# Patient Record
Sex: FEMALE | Employment: OTHER | URBAN - METROPOLITAN AREA
[De-identification: names, ages, dates, MRNs, and addresses within clinical notes are randomized per-mention and may not be internally consistent; named-entity substitution may affect disease eponyms.]

---

## 2020-07-10 ENCOUNTER — TELEPHONE (OUTPATIENT)
Dept: HEMATOLOGY/ONCOLOGY | Facility: CLINIC | Age: 70
End: 2020-07-10

## 2020-07-10 DIAGNOSIS — C20 RECTAL CANCER: Primary | ICD-10-CM

## 2020-07-10 NOTE — TELEPHONE ENCOUNTER
Received fax referral for pt to see surgical oncology for rectal ca s/p chemo/XRT. Records scanned into media. Messaged  to look at chart. She placed order for MRI to be done prior to visit. Attempted to call pt to review referral & schedule, no answer, no voicemail. Called referring provider's office & left message for Rain Delaney.

## 2020-07-13 NOTE — TELEPHONE ENCOUNTER
Called & spoke with pt's sister, scheduled appt for next Monday at 9am with . Will mail appt letter. Spoke with referring provider's office, they are going to overnight CD of imaging. Pt's sister thinks that pt had an MRI last week. Once CD received, if no MRI done, will schedule for the same day she comes.

## 2020-07-15 NOTE — TELEPHONE ENCOUNTER
Received CD with imaging & brought to film library to upload. Called & spoke with pt's sister, pt only had CT last week so informed sister she will need MRI. They are agreeable to doing in [place], scheduled on Friday evening.

## 2020-07-20 DIAGNOSIS — C20 RECTAL CANCER: Primary | ICD-10-CM

## 2020-07-21 ENCOUNTER — TELEPHONE (OUTPATIENT)
Dept: SURGERY | Facility: CLINIC | Age: 70
End: 2020-07-21

## 2020-07-27 ENCOUNTER — TELEPHONE (OUTPATIENT)
Dept: SURGERY | Facility: CLINIC | Age: 70
End: 2020-07-27

## 2020-07-27 NOTE — TELEPHONE ENCOUNTER
----- Message from Trisha Ruth sent at 7/27/2020 11:05 AM CDT -----  Contact: sister  Pt's sister calling to reschedule appts for tomorrow         Please contact Lisa (sister): [phone]

## 2020-07-27 NOTE — TELEPHONE ENCOUNTER
----- Message from Miranda Jung sent at 7/27/2020  2:53 PM CDT -----  Regarding: Appointment  Contact: Lisa/ [phone]  Calling regarding missed call from [place] to reschedule appointments on the same day. Please call

## 2020-08-11 ENCOUNTER — TELEPHONE (OUTPATIENT)
Dept: SURGERY | Facility: CLINIC | Age: 70
End: 2020-08-11

## 2020-08-11 ENCOUNTER — HOSPITAL ENCOUNTER (OUTPATIENT)
Dept: RADIOLOGY | Facility: HOSPITAL | Age: 70
Discharge: HOME | End: 2020-08-11
Attending: COLON & RECTAL SURGERY
Payer: MEDICARE

## 2020-08-11 ENCOUNTER — OFFICE VISIT (OUTPATIENT)
Dept: SURGERY | Facility: CLINIC | Age: 70
End: 2020-08-11
Attending: COLON & RECTAL SURGERY
Payer: MEDICARE

## 2020-08-11 VITALS
BODY MASS INDEX: 17.53 KG/M2 | WEIGHT: 95.25 LBS | HEIGHT: 62 IN | HEART RATE: 58 BPM | SYSTOLIC BLOOD PRESSURE: 181 MMHG | DIASTOLIC BLOOD PRESSURE: 83 MMHG

## 2020-08-11 DIAGNOSIS — C20 RECTAL CANCER: ICD-10-CM

## 2020-08-11 DIAGNOSIS — C20 RECTAL CANCER: Primary | ICD-10-CM

## 2020-08-11 PROCEDURE — 25500020 PHARM REV CODE 255: Performed by: COLON & RECTAL SURGERY

## 2020-08-11 PROCEDURE — 99203 OFFICE O/P NEW LOW 30 MIN: CPT | Mod: S$GLB,,, | Performed by: COLON & RECTAL SURGERY

## 2020-08-11 PROCEDURE — 1101F PR PT FALLS ASSESS DOC 0-1 FALLS W/OUT INJ PAST YR: ICD-10-PCS | Mod: CPTII,S$GLB,, | Performed by: COLON & RECTAL SURGERY

## 2020-08-11 PROCEDURE — 1125F PR PAIN SEVERITY QUANTIFIED, PAIN PRESENT: ICD-10-PCS | Mod: S$GLB,,, | Performed by: COLON & RECTAL SURGERY

## 2020-08-11 PROCEDURE — 1159F MED LIST DOCD IN RCRD: CPT | Mod: S$GLB,,, | Performed by: COLON & RECTAL SURGERY

## 2020-08-11 PROCEDURE — 72197 MRI RECTAL CANCER W W/O CONTRAST: ICD-10-PCS | Mod: 26,,, | Performed by: RADIOLOGY

## 2020-08-11 PROCEDURE — 99203 PR OFFICE/OUTPT VISIT, NEW, LEVL III, 30-44 MIN: ICD-10-PCS | Mod: S$GLB,,, | Performed by: COLON & RECTAL SURGERY

## 2020-08-11 PROCEDURE — 99999 PR PBB SHADOW E&M-EST. PATIENT-LVL III: CPT | Mod: PBBFAC,,, | Performed by: COLON & RECTAL SURGERY

## 2020-08-11 PROCEDURE — 99999 PR PBB SHADOW E&M-EST. PATIENT-LVL III: ICD-10-PCS | Mod: PBBFAC,,, | Performed by: COLON & RECTAL SURGERY

## 2020-08-11 PROCEDURE — A9585 GADOBUTROL INJECTION: HCPCS | Performed by: COLON & RECTAL SURGERY

## 2020-08-11 PROCEDURE — 1159F PR MEDICATION LIST DOCUMENTED IN MEDICAL RECORD: ICD-10-PCS | Mod: S$GLB,,, | Performed by: COLON & RECTAL SURGERY

## 2020-08-11 PROCEDURE — 72197 MRI PELVIS W/O & W/DYE: CPT | Mod: 26,,, | Performed by: RADIOLOGY

## 2020-08-11 PROCEDURE — 1101F PT FALLS ASSESS-DOCD LE1/YR: CPT | Mod: CPTII,S$GLB,, | Performed by: COLON & RECTAL SURGERY

## 2020-08-11 PROCEDURE — 72197 MRI PELVIS W/O & W/DYE: CPT | Mod: TC

## 2020-08-11 PROCEDURE — 3008F PR BODY MASS INDEX (BMI) DOCUMENTED: ICD-10-PCS | Mod: CPTII,S$GLB,, | Performed by: COLON & RECTAL SURGERY

## 2020-08-11 PROCEDURE — 1125F AMNT PAIN NOTED PAIN PRSNT: CPT | Mod: S$GLB,,, | Performed by: COLON & RECTAL SURGERY

## 2020-08-11 PROCEDURE — 3008F BODY MASS INDEX DOCD: CPT | Mod: CPTII,S$GLB,, | Performed by: COLON & RECTAL SURGERY

## 2020-08-11 RX ORDER — GADOBUTROL 604.72 MG/ML
7 INJECTION INTRAVENOUS
Status: COMPLETED | OUTPATIENT
Start: 2020-08-11 | End: 2020-08-11

## 2020-08-11 RX ORDER — OMEPRAZOLE 20 MG/1
20 CAPSULE, DELAYED RELEASE ORAL 2 TIMES DAILY
COMMUNITY

## 2020-08-11 RX ORDER — METOPROLOL TARTRATE 50 MG/1
50 TABLET ORAL DAILY
COMMUNITY

## 2020-08-11 RX ORDER — ASPIRIN 325 MG
325 TABLET ORAL DAILY
COMMUNITY

## 2020-08-11 RX ORDER — AMLODIPINE BESYLATE 5 MG/1
5 TABLET ORAL DAILY
COMMUNITY

## 2020-08-11 RX ORDER — POTASSIUM CHLORIDE 750 MG/1
10 TABLET, EXTENDED RELEASE ORAL ONCE
COMMUNITY

## 2020-08-11 RX ADMIN — GADOBUTROL 7 ML: 604.72 INJECTION INTRAVENOUS at 11:08

## 2020-08-11 NOTE — PROGRESS NOTES
CRS Office Visit History and Physical    Referring MD:   Asmita Sharma Md  [address] [facility]  [address]  [place] [address]    SUBJECTIVE:     Chief Complaint: Rectal cancer    History of Present Illness:  The patient is new to this practice.     Patient is a 69 y.o. year old female who is seen in consultation due to a diagnosis of rectal cancer involving the distal rectum.  The tumor was identified by colonoscopy.  Evaluation was prompted for rectal bleeding.  The tumor was biopsied and histology showed a moderately differentiated neoplasm. Colonoscopy revealed a mass located 1-8 cm from the anal verge, encircling 2/3 of circumference on the right posterolateral wall, fungating, apple core. Biopsies showed moderately differentiated adenocarcinoma (No MMR abnormalities). There were also 2 sigmoid polylps, both pedunculated 1-2 cm in size, both completely removed, one significant for moderately differentiated adenocarcinoma arising within a tubulovillous adenoma.    MRI Abdomen (3/13/2020)  No suspicious lesions    MRI Pelvis (4/3/2020)  Circumferential wall thickening of distal 8cm of rectum to anal verge  No involvement of mesorectal fat or vaginal cuff  Twelve 1cm or less lymph nodes (read as suspicious)  cT2N1    CT c/a/p (March 2020)   No metastatic disease    Treated with ChemoXRT (4/13/20 - 5/22/2020)    MRI Rectal Cancer (8/11/2020)        Prior colonoscopy: Yes - as above  Prior abdominal surgery: No  Prior pelvic or anorectal surgery: No  Family history of colon/rectal cancer: No  Family history of IBD: No  Steroid or other immunosuppression: No  Blood thinners: Yes - aspirin  Current stool softeners or fiber supplement: No  Active smoking: No    [place] (FI):  Leakage of solid stool:  Never (0)      Leakage of liquid stool:  Never (0)        Leakage of flatus:   Never (0)         Wear a pad:    Never (0)        Alter life:    Never (0)         Total: 0    Altomare (ODS):  How long on toilet:   6-10min (1)   How  "many times/day: 3-4 (2)    Digitation:  Monthly (1)   Laxatives:   Never (0)   Enemas:    Never (0)   Incomplete stool:  Never (0)   Strain:   <25% (1)   Total: 5    Stool consistency/Braxton: 5  Bowel movements per month:  Daily     Leakage of urine: No  Trouble emptying your bladder: No  Feel something bulging through vagina? No            Review of patient's allergies indicates:  No Known Allergies    Past Medical History:   Diagnosis Date    HTN (hypertension)      Past Surgical History:   Procedure Laterality Date    CARDIAC SURGERY       Family History   Problem Relation Age of Onset    Lung cancer Father     Throat cancer Sister     Breast cancer Maternal Aunt      Social History     Tobacco Use    Smoking status: Current Every Day Smoker     Packs/day: 0.25   Substance Use Topics    Alcohol use: Not on file    Drug use: Not on file        Review of Systems:  Review of Systems   All other systems reviewed and are negative.      OBJECTIVE:     Vital Signs (Most Recent)  BP (!) 181/83 (BP Location: Right arm, Patient Position: Sitting, BP Method: Large (Automatic))   Pulse (!) 58   Ht 5' 2" (1.575 m)   Wt 43.2 kg (95 lb 3.8 oz)   BMI 17.42 kg/m²     Physical Exam:  General: Unknown female in no distress   Neuro: Alert and oriented x 4.  Moves all extremities.     HEENT: No icterus.  Trachea midline  Respiratory: Respirations are even and unlabored  Cardiac: Regular rate  Extremities: Warm dry and intact  Skin: No rashes  Anorectal:  External exam with nonthrombosed skin tags.  Unable to tolerate digital exam without significant discomfort.  Exam and flexible sigmoidoscopy aborted at this time with plan for procedure under sedation.    Imaging:   As above       ASSESSMENT/PLAN:     69-year-old female with low cT2N1 rectal adenocarcinoma, status post chemo radiotherapy completed May 22nd in [place], presenting for further management.  She underwent restaging MRI today.  Unable to tolerate exam in " the office.  Discussed with the patient and her sister that exam will be critical for determining plan for additional therapy, as well as twice of surgical procedure.  We will plan for a flexible sigmoidoscopy in endoscopy under propofol.  This was scheduled for Tuesday.    Grisel Malone

## 2020-08-11 NOTE — TELEPHONE ENCOUNTER
----- Message from Danyelle Kwong sent at 8/11/2020 11:54 AM CDT -----  Contact: patient  Patient sister called and said they still doing the MRI and still waiting and patient appointment was at 11:30 today     She wants to let you know they are in [place] but the MRI is taking forever     Please call back at [phone]

## 2020-08-11 NOTE — LETTER
August 11, 2020      Asmita Sharma MD  [address]  [address]  [place] [address]           [facility]  [address]  [facility] [address]  Phone: [phone]          Patient: Judith Maynard   MR Number: [id number]   YOB: 1950   Date of Visit: 8/11/2020       Dear Dr. Asmita Sharma:    Thank you for referring Judith Maynard to me for evaluation. Attached you will find relevant portions of my assessment and plan of care.    If you have questions, please do not hesitate to call me. I look forward to following Judith Maynard along with you.    Sincerely,    Grisel Malone MD    Enclosure  CC:  No Recipients    If you would like to receive this communication electronically, please contact [email or web address] or [phone] to request more information on [employer] access.    For providers and/or their staff who would like to refer a patient to [facility], please contact us through our one-stop-shop provider referral line, [facility] , at [phone].    If you feel you have received this communication in error or would no longer like to receive these types of communications, please e-mail [email or web address]

## 2020-08-12 ENCOUNTER — TELEPHONE (OUTPATIENT)
Dept: ENDOSCOPY | Facility: HOSPITAL | Age: 70
End: 2020-08-12

## 2020-08-12 DIAGNOSIS — Z01.818 PRE-OP TESTING: Primary | ICD-10-CM

## 2020-08-12 NOTE — TELEPHONE ENCOUNTER
Several attempts to contact patient to schedule flex-sig-unsuccessful.  Phone rang-unable to leave message. No voicemail set up.

## 2020-08-15 ENCOUNTER — LAB VISIT (OUTPATIENT)
Dept: FAMILY MEDICINE | Facility: CLINIC | Age: 70
End: 2020-08-15
Payer: MEDICARE

## 2020-08-15 DIAGNOSIS — Z01.818 PRE-OP TESTING: ICD-10-CM

## 2020-08-15 PROCEDURE — U0003 INFECTIOUS AGENT DETECTION BY NUCLEIC ACID (DNA OR RNA); SEVERE ACUTE RESPIRATORY SYNDROME CORONAVIRUS 2 (SARS-COV-2) (CORONAVIRUS DISEASE [COVID-19]), AMPLIFIED PROBE TECHNIQUE, MAKING USE OF HIGH THROUGHPUT TECHNOLOGIES AS DESCRIBED BY CMS-2020-01-R: HCPCS

## 2020-08-16 LAB — SARS-COV-2 RNA RESP QL NAA+PROBE: NOT DETECTED

## 2020-08-18 ENCOUNTER — ANESTHESIA EVENT (OUTPATIENT)
Dept: ENDOSCOPY | Facility: HOSPITAL | Age: 70
End: 2020-08-18
Payer: MEDICARE

## 2020-08-18 ENCOUNTER — ANESTHESIA (OUTPATIENT)
Dept: ENDOSCOPY | Facility: HOSPITAL | Age: 70
End: 2020-08-18
Payer: MEDICARE

## 2020-08-18 ENCOUNTER — HOSPITAL ENCOUNTER (OUTPATIENT)
Facility: HOSPITAL | Age: 70
Discharge: HOME | End: 2020-08-18
Attending: COLON & RECTAL SURGERY | Admitting: COLON & RECTAL SURGERY
Payer: MEDICARE

## 2020-08-18 VITALS
WEIGHT: 95 LBS | OXYGEN SATURATION: 100 % | BODY MASS INDEX: 17.48 KG/M2 | DIASTOLIC BLOOD PRESSURE: 83 MMHG | HEIGHT: 62 IN | RESPIRATION RATE: 22 BRPM | HEART RATE: 64 BPM | SYSTOLIC BLOOD PRESSURE: 172 MMHG | TEMPERATURE: 98 F

## 2020-08-18 DIAGNOSIS — C20 RECTAL CANCER: ICD-10-CM

## 2020-08-18 PROCEDURE — 45335 PR SIGMOIDOSCOPY,FLEX,W/DIR SUBMUC INJECT: ICD-10-PCS | Mod: ,,, | Performed by: COLON & RECTAL SURGERY

## 2020-08-18 PROCEDURE — 45335 SIGMOIDOSCOPY W/SUBMUC INJ: CPT | Performed by: COLON & RECTAL SURGERY

## 2020-08-18 PROCEDURE — 27201028 HC NEEDLE, SCLERO: Performed by: COLON & RECTAL SURGERY

## 2020-08-18 PROCEDURE — D9220A PRA ANESTHESIA: Mod: ANES,,, | Performed by: ANESTHESIOLOGY

## 2020-08-18 PROCEDURE — D9220A PRA ANESTHESIA: Mod: CRNA,,, | Performed by: NURSE ANESTHETIST, CERTIFIED REGISTERED

## 2020-08-18 PROCEDURE — 00811 ANES LWR INTST NDSC NOS: CPT | Performed by: COLON & RECTAL SURGERY

## 2020-08-18 PROCEDURE — D9220A PRA ANESTHESIA: ICD-10-PCS | Mod: CRNA,,, | Performed by: NURSE ANESTHETIST, CERTIFIED REGISTERED

## 2020-08-18 PROCEDURE — 25000003 PHARM REV CODE 250: Performed by: COLON & RECTAL SURGERY

## 2020-08-18 PROCEDURE — 45335 SIGMOIDOSCOPY W/SUBMUC INJ: CPT | Mod: ,,, | Performed by: COLON & RECTAL SURGERY

## 2020-08-18 PROCEDURE — 37000009 HC ANESTHESIA EA ADD 15 MINS: Performed by: COLON & RECTAL SURGERY

## 2020-08-18 PROCEDURE — 37000008 HC ANESTHESIA 1ST 15 MINUTES: Performed by: COLON & RECTAL SURGERY

## 2020-08-18 PROCEDURE — 25000003 PHARM REV CODE 250: Performed by: NURSE ANESTHETIST, CERTIFIED REGISTERED

## 2020-08-18 PROCEDURE — 63600175 PHARM REV CODE 636 W HCPCS: Performed by: NURSE ANESTHETIST, CERTIFIED REGISTERED

## 2020-08-18 PROCEDURE — D9220A PRA ANESTHESIA: ICD-10-PCS | Mod: ANES,,, | Performed by: ANESTHESIOLOGY

## 2020-08-18 RX ORDER — PROPOFOL 10 MG/ML
VIAL (ML) INTRAVENOUS CONTINUOUS PRN
Status: DISCONTINUED | OUTPATIENT
Start: 2020-08-18 | End: 2020-08-18

## 2020-08-18 RX ORDER — GLYCOPYRROLATE 0.2 MG/ML
INJECTION INTRAMUSCULAR; INTRAVENOUS
Status: DISCONTINUED | OUTPATIENT
Start: 2020-08-18 | End: 2020-08-18

## 2020-08-18 RX ORDER — SODIUM CHLORIDE 9 MG/ML
INJECTION, SOLUTION INTRAVENOUS CONTINUOUS
Status: DISCONTINUED | OUTPATIENT
Start: 2020-08-18 | End: 2020-08-18 | Stop reason: HOSPADM

## 2020-08-18 RX ORDER — PROPOFOL 10 MG/ML
VIAL (ML) INTRAVENOUS
Status: DISCONTINUED | OUTPATIENT
Start: 2020-08-18 | End: 2020-08-18

## 2020-08-18 RX ORDER — LIDOCAINE HYDROCHLORIDE 20 MG/ML
INJECTION INTRAVENOUS
Status: DISCONTINUED | OUTPATIENT
Start: 2020-08-18 | End: 2020-08-18

## 2020-08-18 RX ADMIN — PROPOFOL 20 MG: 10 INJECTION, EMULSION INTRAVENOUS at 07:08

## 2020-08-18 RX ADMIN — LIDOCAINE HYDROCHLORIDE 60 MG: 20 INJECTION, SOLUTION INTRAVENOUS at 07:08

## 2020-08-18 RX ADMIN — PROPOFOL 150 MCG/KG/MIN: 10 INJECTION, EMULSION INTRAVENOUS at 07:08

## 2020-08-18 RX ADMIN — GLYCOPYRROLATE 0.2 MG: 0.2 INJECTION, SOLUTION INTRAMUSCULAR; INTRAVENOUS at 07:08

## 2020-08-18 RX ADMIN — SODIUM CHLORIDE: 0.9 INJECTION, SOLUTION INTRAVENOUS at 07:08

## 2020-08-18 RX ADMIN — PROPOFOL 60 MG: 10 INJECTION, EMULSION INTRAVENOUS at 07:08

## 2020-08-18 NOTE — H&P
Endoscopy H&P    Procedure: Flexible sigmoidoscopy    69-year-old female with low cT2N1 rectal adenocarcinoma, status post chemo radiotherapy completed May 22nd.      Past Medical History:   Diagnosis Date    HTN (hypertension)        Sedation Problems: No    Family History   Problem Relation Age of Onset    Lung cancer Father     Throat cancer Sister     Breast cancer Maternal Aunt        Fam Hx of Sedation Problems: No    Social History     Socioeconomic History    Marital status: Unknown     Spouse name: Not on file    Number of children: Not on file    Years of education: Not on file    Highest education level: Not on file   Occupational History    Not on file   Social Needs    Financial resource strain: Not on file    Food insecurity     Worry: Not on file     Inability: Not on file    Transportation needs     Medical: Not on file     Non-medical: Not on file   Tobacco Use    Smoking status: Current Every Day Smoker     Packs/day: 0.25   Substance and Sexual Activity    Alcohol use: Not on file    Drug use: Not on file    Sexual activity: Not on file   Lifestyle    Physical activity     Days per week: Not on file     Minutes per session: Not on file    Stress: Not on file   Relationships    Social connections     Talks on phone: Not on file     Gets together: Not on file     Attends Alevism service: Not on file     Active member of club or organization: Not on file     Attends meetings of clubs or organizations: Not on file     Relationship status: Not on file   Other Topics Concern    Not on file   Social History Narrative    Not on file       Review of patient's allergies indicates:  No Known Allergies      Current Facility-Administered Medications:     0.9%  NaCl infusion, , Intravenous, Continuous, Grisel Malone MD    Review of Systems:  Respiratory ROS: no cough, shortness of breath, or  wheezing  Cardiovascular ROS: no chest pain or dyspnea on exertion  Gastrointestinal ROS: no abdominal pain, change in bowel habits, or black or bloody stools  Musculoskeletal ROS: negative  Neurological ROS: no TIA or stroke symptoms        Physical Exam:  General: no distress  Head: normocephalic  Airway:  normal oropharynx, airway normal  Neck: supple, symmetrical, trachea midline  Lungs:  clear to auscultation bilaterally and normal respiratory effort  Heart: regular rate and rhythm, S1, S2 normal, no murmur, rub or gallop  Abdomen: soft, non-tender non-distented; bowel sounds normal; no masses,  no organomegaly  Extremities: no cyanosis or edema, or clubbing      Deep Sedation: Mallampati Score per anesthesia     Sedation Plan: Choice     ASA: II    Plan:  - Proceed with diagnostic sigmoidoscopy  - Risks, benefits, alternatives to the procedure discussed with the patient who wishes to proceed  - All questions and concerns addressed  - Consents obtained today    8/18/2020 6:48 AM

## 2020-08-18 NOTE — ANESTHESIA POSTPROCEDURE EVALUATION
Anesthesia Post Evaluation    Patient: Judith Maynard    Procedure(s) Performed: Procedure(s) (LRB):  SIGMOIDOSCOPY, FLEXIBLE (N/A)    Final Anesthesia Type: general    Patient location during evaluation: [facility]  Patient participation: Yes- Able to Participate  Level of consciousness: awake and alert and oriented  Post-procedure vital signs: reviewed and stable  Pain management: adequate  Airway patency: patent    PONV status at discharge: No PONV  Anesthetic complications: no      Cardiovascular status: blood pressure returned to baseline  Respiratory status: unassisted, spontaneous ventilation and room air  Hydration status: euvolemic  Follow-up not needed.          Vitals Value Taken Time   /83 08/18/20 0832   Temp 36.4 °C (97.5 °F) 08/18/20 0830   Pulse 60 08/18/20 0833   Resp 23 08/18/20 0833   SpO2 98 % 08/18/20 0833   Vitals shown include unvalidated device data.      No case tracking events are documented in the log.      Pain/Georgina Score: Georgina Score: 9 (8/18/2020  8:00 AM)

## 2020-08-18 NOTE — TRANSFER OF CARE
"Anesthesia Transfer of Care Note    Patient: Judith Maynard    Procedure(s) Performed: Procedure(s) (LRB):  SIGMOIDOSCOPY, FLEXIBLE (N/A)    Patient location: [facility]    Anesthesia Type: general    Transport from OR: Transported from OR on 6-10 L/min O2 by face mask with adequate spontaneous ventilation    Post pain: adequate analgesia    Post assessment: no apparent anesthetic complications and tolerated procedure well    Post vital signs: stable    Level of consciousness: alert, awake and oriented    Nausea/Vomiting: no nausea/vomiting    Complications: none    Transfer of care protocol was followed      Last vitals:   Visit Vitals  /68   Pulse 68   Temp 36.3 °C (97.3 °F) (Temporal)   Resp 18   Ht 5' 2" (1.575 m)   Wt 43.1 kg (95 lb)   SpO2 100%   Breastfeeding No   BMI 17.38 kg/m²     "

## 2020-08-18 NOTE — ANESTHESIA PREPROCEDURE EVALUATION
[facility]  Anesthesia Pre-Operative Evaluation         Patient Name: Judith Maynard  YOB: 1950  MRN: [id number]    SUBJECTIVE:     08/18/2020    Procedure(s) (LRB):  SIGMOIDOSCOPY, FLEXIBLE (N/A)    Judith Maynard is a 69 y.o. female here for above procedure    Drips:    sodium chloride 0.9%         Patient Active Problem List   Diagnosis    Rectal cancer       Review of patient's allergies indicates:  No Known Allergies    No current facility-administered medications on file prior to encounter.      Current Outpatient Medications on File Prior to Encounter   Medication Sig Dispense Refill    amLODIPine (NORVASC) 5 MG tablet Take 5 mg by mouth once daily.      aspirin 325 MG tablet Take 325 mg by mouth once daily.      calcium carbonate (CALCIUM ANTACID) 300 mg (750 mg) Chew Take 2 tablets by mouth once daily.      metoprolol tartrate (LOPRESSOR) 50 MG tablet Take 50 mg by mouth once daily.      omeprazole (PRILOSEC) 20 MG capsule Take 20 mg by mouth 2 (two) times a day.      potassium chloride (KLOR-CON) 10 MEQ TbSR Take 10 mEq by mouth once.         Past Surgical History:   Procedure Laterality Date    CARDIAC SURGERY         Social History     Socioeconomic History    Marital status: Unknown     Spouse name: Not on file    Number of children: Not on file    Years of education: Not on file    Highest education level: Not on file   Occupational History    Not on file   Social Needs    Financial resource strain: Not on file    Food insecurity     Worry: Not on file     Inability: Not on file    Transportation needs     Medical: Not on file     Non-medical: Not on file   Tobacco Use    Smoking status: Current Every Day Smoker     Packs/day: 0.25   Substance and Sexual Activity    Alcohol use: Yes     Alcohol/week: 3.0 standard drinks     Types: 3 Cans of beer per week    Drug use: Never    Sexual activity: Not  on file   Lifestyle    Physical activity     Days per week: Not on file     Minutes per session: Not on file    Stress: Not on file   Relationships    Social connections     Talks on phone: Not on file     Gets together: Not on file     Attends Worship service: Not on file     Active member of club or organization: Not on file     Attends meetings of clubs or organizations: Not on file     Relationship status: Not on file   Other Topics Concern    Not on file   Social History Narrative    Not on file         OBJECTIVE:     Vital Signs Range (Last 24H):  Pulse:  [57] 57  Resp:  [17] 17  SpO2:  [100 %] 100 %  BP: (181)/(76) 181/76    Significant Labs:  No results found for: WBC, HGB, HCT, PLT, CHOL, TRIG, HDL, LDLDIRECT, ALT, AST, NA, K, CL, CREATININE, BUN, CO2, TSH, PSA, INR, GLUF, HGBA1C, MICROALBUR    Diagnostic Studies:    EKG:   No results found for this or any previous visit.    2D ECHO:  TTE:  No results found for this or any previous visit.      ALEXANDER:  No results found for this or any previous visit.      Anesthesia Evaluation    I have reviewed the Patient Summary Reports.    I have reviewed the Nursing Notes. I have reviewed the NPO Status.   I have reviewed the Medications.     Review of Systems  Anesthesia Hx:  No problems with previous Anesthesia  History of prior surgery of interest to airway management or planning: heart surgery.   Cardiovascular:  Functional Capacity good / => 4 METS        Physical Exam  General:  Well nourished    Airway/Jaw/Neck:  Airway Findings: Mouth Opening: Normal Tongue: Normal  General Airway Assessment: Adult  Mallampati: I  TM Distance: Normal, at least 6 cm  Jaw/Neck Findings:  Neck ROM: Normal ROM     Eyes/Ears/Nose:  EYES/EARS/NOSE FINDINGS: Normal   Dental:  Dental Findings: In tact, Periodontal disease, Mild, Upper partial dentures    Chest/Lungs:  Chest/Lungs Findings: Clear to auscultation, Normal Respiratory Rate     Heart/Vascular:  Heart Findings: Rate:  Normal  Rhythm: Regular Rhythm  Sounds: Normal  Vascular Findings: Normal    Abdomen:  Abdomen Findings:  Normal, Soft, Nontender      Skin:  Skin Findings: Normal    Mental Status:  Mental Status Findings:  Cooperative, Alert and Oriented         Anesthesia Plan  Type of Anesthesia, risks & benefits discussed:  Anesthesia Type:  general  Patient's Preference:   Intra-op Monitoring Plan: standard ASA monitors  Intra-op Monitoring Plan Comments:   Post Op Pain Control Plan: multimodal analgesia, IV/PO Opioids PRN and per primary service following discharge from PACU  Post Op Pain Control Plan Comments:   Induction:   IV  Beta Blocker:  Patient is on a Beta-Blocker and has received one dose within the past 24 hours (No further documentation required).       Informed Consent: Patient understands risks and agrees with Anesthesia plan.  Questions answered. Anesthesia consent signed with patient.  ASA Score: 3     Day of Surgery Review of History & Physical:    H&P update referred to the surgeon.         Ready For Surgery From Anesthesia Perspective.

## 2020-08-18 NOTE — [employer] PATIENT INSTRUCTIONS
Discharge Summary/Instructions after an Endoscopic Procedure  Patient Name: Judith Maynard  Patient MRN: [id number]  Patient YOB: 1950  Tuesday, August 18, 2020  Grisel Malone MD  RESTRICTIONS:  During your procedure today, you received medications for sedation.  These   medications may affect your judgment, balance and coordination.  Therefore,   for 24 hours, you have the following restrictions:   - DO NOT drive a car, operate machinery, make legal/financial decisions,   sign important papers or drink alcohol.    ACTIVITY:  Today: no heavy lifting, straining or running due to procedural   sedation/anesthesia.  The following day: return to full activity including work.  DIET:  Eat and drink normally unless instructed otherwise.     TREATMENT FOR COMMON SIDE EFFECTS:  - Mild abdominal pain, nausea, belching, bloating or excessive gas:  rest,   eat lightly and use a heating pad.  - Sore Throat: treat with throat lozenges and/or gargle with warm salt   water.  - Because air was used during the procedure, expelling large amounts of air   from your rectum or belching is normal.  - If a bowel prep was taken, you may not have a bowel movement for 1-3 days.    This is normal.  SYMPTOMS TO WATCH FOR AND REPORT TO YOUR PHYSICIAN:  1. Abdominal pain or bloating, other than gas cramps.  2. Chest pain.  3. Back pain.  4. Signs of infection such as: chills or fever occurring within 24 hours   after the procedure.  5. Rectal bleeding, which would show as bright red, maroon, or black stools.   (A tablespoon of blood from the rectum is not serious, especially if   hemorrhoids are present.)  6. Vomiting.  7. Weakness or dizziness.  GO DIRECTLY TO THE NEAREST EMERGENCY ROOM IF YOU HAVE ANY OF THE FOLLOWING:      Difficulty breathing              Chills and/or fever over 101 F   Persistent vomiting and/or vomiting blood   Severe abdominal pain   Severe chest pain   Black, tarry stools   Bleeding- more than one  tablespoon   Any other symptom or condition that you feel may need urgent attention  Your doctor recommends these additional instructions:  If any biopsies were taken, your doctors clinic will contact you in 1 to 2   weeks with any results.  - Continue present medications.   - Plan to discuss at MDT for consideration of consolidation chemotherapy vs   surgery.  - Return to my office at appointment to be scheduled.   - Discharge patient to home.  For questions, problems or results please call your physician - Grisel Malone MD at Work:  [phone].  [facility], EMERGENCY ROOM PHONE NUMBER: [phone]  IF A COMPLICATION OR EMERGENCY SITUATION ARISES AND YOU ARE UNABLE TO REACH   YOUR PHYSICIAN - GO DIRECTLY TO THE EMERGENCY ROOM.  Grisel Malone MD  8/18/2020 7:40:03 AM  This report has been verified and signed electronically.  [employer]

## 2020-08-18 NOTE — DISCHARGE INSTRUCTIONS
Sigmoidoscopy    Sigmoidoscopy is a procedure used to view the lower colon and rectum. This test can help find the source of belly pain, rectal bleeding, and changes in bowel habits. Sigmoidoscopy is also used as part of the screening for colorectal cancer. It is done using a sigmoidoscope, a flexible tube with a viewing lens and light.  If youre 50 or over, the American Cancer Society recommends having this test in addition to stool tests, every 5 years to screen for colorectal cancer. Your healthcare provider may also recommend other colon cancer screening methods such as colonoscopy.   Getting ready  Here is how to prepare:  · Be sure to tell your healthcare provider about any medicines you take. Also tell your healthcare provider about any health conditions you may have.  · Ask your healthcare provider about the risks of the test. These include bleeding and bowel puncture.  · Your rectum and colon must be empty for the test, so be sure to follow the diet and bowel prep instructions. Otherwise the test may need to be rescheduled.  During the test  Here is what to expect:  · The test is done in the healthcare providers office or in a hospital endoscopy unit. You may wear a gown or a drape over your lower body.  · The procedure usually takes 10 to 20 minutes.  · The healthcare provider does a digital rectal exam to check for anal and rectal problems. The rectum is lubricated and the scope inserted.  · You may have a feeling similar to needing to have a bowel movement. You may also feel pressure when air is pumped into the colon This is done so that the healthcare provider can get a better view. Its expected that you will pass gas during the procedure.  After the test  Here is what to expect:  · Usually youll discuss the results with your healthcare provider right away, unless youre having other tests.  · If biopsies (tissue samples) were taken, you'll want to ask when to contact the for results.   · Try to  pass all the gas right after the test. Otherwise you may have bloating and cramping.  · After the test you can go back to your normal eating and other activities.  When to call your healthcare provider  Call if you have any of the following after the procedure:  · Pain in your belly  · Fever  · Dizziness or weakness  · Excessive rectal bleeding. Slight bleeding or spotting is normal, especially if a biopsy was taken.   Date Last Reviewed: 7/1/2016 © 2000-2017 [employer]. [address] [place] [address]. All rights reserved. This information is not intended as a substitute for professional medical care. Always follow your healthcare professional's instructions.

## 2020-08-19 ENCOUNTER — DOCUMENTATION ONLY (OUTPATIENT)
Dept: SURGERY | Facility: HOSPITAL | Age: 70
End: 2020-08-19

## 2020-08-19 NOTE — PROGRESS NOTES
Multidisciplinary Rectal Cancer Conference - Evaluation and Recommendation Summary  8/19/2020  Judith Maynard  [id number]  69 y.o. female    1. Evaluation    MRI date: 4/3/20    Tumor Location in Rectum: lower third    Indication of Sphincter Involvement:  Uninvolved    Pretreatment Circumferential Resection Margin (CRM) status:  Threatened    Pretreatment (clinical) AJCC Stage: IIIA  Stage I  [] I: T1N0M0  [] I: T2N0M0  Stage II  [] IIA: T3N0M0  [] IIB R7pR8P2  [] IIC: [id number]  Stage III  [x] IIIA: T1-2N1M0  [] IIIA: U8Y9iT1  [] IIIB: T3-Z9nU6B9  [] IIIB: T2-3N2aM0  [] IIIB: T1-2N2bM0  [] IIIC: C0sE7qA8  [] IIIC: T3-7oP8oE2  [] IIIC: Z1qC0-3B6   Stage IV  [] IV: Q3-4C5-8O9d-b    CEA level: No results found for: CEA     Neoadjuvant Therapy  Treated with ChemoXRT (4/13/20 - 5/22/2020)    Post Treatment Imaging:  Repeat MRI 8/11/20.    Distance of inferior margin of treated tumor/treated area to the anal verge: 3 cm     Distance of inferior margin of treated tumor/treated area to the anorectal junction: 1.6 cm     Relationship to anterior peritoneal reflection: Below     Craniocaudal length: 3.4 cm     Previous craioncaudal length: 8 cm     CRM (for T3 only)     Shortest distance of tumor to MRF (or anticipated CRM): 5 mm along the right anterolateral wall (axial series left 1001 image 29).     Mesorectal/superior rectal lymph nodes and/or tumor deposits:There are few scattered lymph nodes with normal morphology and less than 5 mm short axis, a significant improvement since prior exam.  The closest lymph node to the mesorectal fascia is 2.5 mm (axial series 94388, image 29).     Extra mesorectal lymph nodes: No     Impression:     Significant overall decrease in the primary tumor bulk with persistent right posterolateral wall thickening and enhancement with spiculation into the mesorectal fat, likely representing residual tumor versus desmoplastic reaction.     Near complete resolution of the abnormal mesorectal fat  lymph nodes with a few scattered normal size lymph nodes on today's exam measuring less than 5 mm short axis and demonstrating normal morphology.    2. Treatment Recommendation    Discussed possibility of completing chemotherapy before progressing to surgery. Will discuss with patients oncologist.

## 2021-03-08 DIAGNOSIS — C20 RECTAL CANCER: Primary | ICD-10-CM

## 2021-03-09 ENCOUNTER — TELEPHONE (OUTPATIENT)
Dept: SURGERY | Facility: CLINIC | Age: 71
End: 2021-03-09

## 2021-03-10 ENCOUNTER — TELEPHONE (OUTPATIENT)
Dept: SURGERY | Facility: CLINIC | Age: 71
End: 2021-03-10

## 2021-03-10 DIAGNOSIS — E55.9 VITAMIN D DEFICIENCY: ICD-10-CM

## 2021-03-10 DIAGNOSIS — C20 RECTAL CANCER: Primary | ICD-10-CM

## 2021-03-10 DIAGNOSIS — E16.2 HYPOGLYCEMIA: ICD-10-CM

## 2021-03-10 DIAGNOSIS — R53.81 PHYSICAL DECONDITIONING: ICD-10-CM

## 2021-03-12 ENCOUNTER — TELEPHONE (OUTPATIENT)
Dept: SURGERY | Facility: CLINIC | Age: 71
End: 2021-03-12

## 2021-03-23 ENCOUNTER — TELEPHONE (OUTPATIENT)
Dept: SURGERY | Facility: CLINIC | Age: 71
End: 2021-03-23

## 2021-03-26 ENCOUNTER — TELEPHONE (OUTPATIENT)
Dept: SURGERY | Facility: CLINIC | Age: 71
End: 2021-03-26

## 2021-04-15 ENCOUNTER — TELEPHONE (OUTPATIENT)
Dept: SURGERY | Facility: CLINIC | Age: 71
End: 2021-04-15

## 2021-04-16 ENCOUNTER — HOSPITAL ENCOUNTER (OUTPATIENT)
Dept: RADIOLOGY | Facility: HOSPITAL | Age: 71
Discharge: HOME | End: 2021-04-16
Attending: COLON & RECTAL SURGERY
Payer: MEDICARE

## 2021-04-16 ENCOUNTER — OFFICE VISIT (OUTPATIENT)
Dept: SURGERY | Facility: CLINIC | Age: 71
End: 2021-04-16
Payer: MEDICARE

## 2021-04-16 ENCOUNTER — OFFICE VISIT (OUTPATIENT)
Dept: SURGERY | Facility: CLINIC | Age: 71
End: 2021-04-16
Attending: COLON & RECTAL SURGERY
Payer: MEDICARE

## 2021-04-16 VITALS
HEART RATE: 78 BPM | SYSTOLIC BLOOD PRESSURE: 128 MMHG | WEIGHT: 113.06 LBS | HEIGHT: 62 IN | DIASTOLIC BLOOD PRESSURE: 70 MMHG | BODY MASS INDEX: 20.8 KG/M2

## 2021-04-16 DIAGNOSIS — C20 RECTAL CANCER: ICD-10-CM

## 2021-04-16 DIAGNOSIS — C20 RECTAL CANCER: Primary | ICD-10-CM

## 2021-04-16 DIAGNOSIS — Z01.818 PREOP EXAMINATION: ICD-10-CM

## 2021-04-16 DIAGNOSIS — Z71.89 ENCOUNTER FOR OSTOMY CARE EDUCATION: Primary | ICD-10-CM

## 2021-04-16 PROCEDURE — 1159F PR MEDICATION LIST DOCUMENTED IN MEDICAL RECORD: ICD-10-PCS | Mod: S$GLB,,, | Performed by: COLON & RECTAL SURGERY

## 2021-04-16 PROCEDURE — 1126F AMNT PAIN NOTED NONE PRSNT: CPT | Mod: S$GLB,,, | Performed by: COLON & RECTAL SURGERY

## 2021-04-16 PROCEDURE — 72195 MRI PELVIS W/O DYE: CPT | Mod: TC

## 2021-04-16 PROCEDURE — 72195 MRI PELVIS W/O DYE: CPT | Mod: 26,,, | Performed by: RADIOLOGY

## 2021-04-16 PROCEDURE — 99999 PR PBB SHADOW E&M-EST. PATIENT-LVL II: ICD-10-PCS | Mod: PBBFAC,,, | Performed by: NURSE PRACTITIONER

## 2021-04-16 PROCEDURE — 1126F PR PAIN SEVERITY QUANTIFIED, NO PAIN PRESENT: ICD-10-PCS | Mod: S$GLB,,, | Performed by: COLON & RECTAL SURGERY

## 2021-04-16 PROCEDURE — 3288F FALL RISK ASSESSMENT DOCD: CPT | Mod: CPTII,S$GLB,, | Performed by: COLON & RECTAL SURGERY

## 2021-04-16 PROCEDURE — 99214 PR OFFICE/OUTPT VISIT, EST, LEVL IV, 30-39 MIN: ICD-10-PCS | Mod: 25,S$GLB,, | Performed by: COLON & RECTAL SURGERY

## 2021-04-16 PROCEDURE — 1101F PR PT FALLS ASSESS DOC 0-1 FALLS W/OUT INJ PAST YR: ICD-10-PCS | Mod: CPTII,S$GLB,, | Performed by: COLON & RECTAL SURGERY

## 2021-04-16 PROCEDURE — 72195 MRI RECTAL CANCER WITHOUT CONTRAST: ICD-10-PCS | Mod: 26,,, | Performed by: RADIOLOGY

## 2021-04-16 PROCEDURE — 45330 PR SIGMOIDOSCOPY,DIAG2STIC: ICD-10-PCS | Mod: S$GLB,,, | Performed by: COLON & RECTAL SURGERY

## 2021-04-16 PROCEDURE — 99024 POSTOP FOLLOW-UP VISIT: CPT | Mod: S$GLB,,, | Performed by: NURSE PRACTITIONER

## 2021-04-16 PROCEDURE — 3288F PR FALLS RISK ASSESSMENT DOCUMENTED: ICD-10-PCS | Mod: CPTII,S$GLB,, | Performed by: COLON & RECTAL SURGERY

## 2021-04-16 PROCEDURE — 1159F MED LIST DOCD IN RCRD: CPT | Mod: S$GLB,,, | Performed by: COLON & RECTAL SURGERY

## 2021-04-16 PROCEDURE — 99999 PR PBB SHADOW E&M-EST. PATIENT-LVL IV: CPT | Mod: PBBFAC,,, | Performed by: COLON & RECTAL SURGERY

## 2021-04-16 PROCEDURE — 99999 PR PBB SHADOW E&M-EST. PATIENT-LVL IV: ICD-10-PCS | Mod: PBBFAC,,, | Performed by: COLON & RECTAL SURGERY

## 2021-04-16 PROCEDURE — 99214 OFFICE O/P EST MOD 30 MIN: CPT | Mod: 25,S$GLB,, | Performed by: COLON & RECTAL SURGERY

## 2021-04-16 PROCEDURE — 45330 DIAGNOSTIC SIGMOIDOSCOPY: CPT | Mod: S$GLB,,, | Performed by: COLON & RECTAL SURGERY

## 2021-04-16 PROCEDURE — 99024 PR POST-OP FOLLOW-UP VISIT: ICD-10-PCS | Mod: S$GLB,,, | Performed by: NURSE PRACTITIONER

## 2021-04-16 PROCEDURE — 3008F PR BODY MASS INDEX (BMI) DOCUMENTED: ICD-10-PCS | Mod: CPTII,S$GLB,, | Performed by: COLON & RECTAL SURGERY

## 2021-04-16 PROCEDURE — 1101F PT FALLS ASSESS-DOCD LE1/YR: CPT | Mod: CPTII,S$GLB,, | Performed by: COLON & RECTAL SURGERY

## 2021-04-16 PROCEDURE — 99999 PR PBB SHADOW E&M-EST. PATIENT-LVL II: CPT | Mod: PBBFAC,,, | Performed by: NURSE PRACTITIONER

## 2021-04-16 PROCEDURE — 3008F BODY MASS INDEX DOCD: CPT | Mod: CPTII,S$GLB,, | Performed by: COLON & RECTAL SURGERY

## 2021-04-16 RX ORDER — POLYETHYLENE GLYCOL 3350 17 G/17G
POWDER, FOR SOLUTION ORAL
Qty: 238 G | Refills: 0 | Status: SHIPPED | OUTPATIENT
Start: 2021-04-16

## 2021-04-16 RX ORDER — NEOMYCIN SULFATE 500 MG/1
TABLET ORAL
Qty: 6 TABLET | Refills: 0 | Status: SHIPPED | OUTPATIENT
Start: 2021-04-16

## 2021-04-16 RX ORDER — METRONIDAZOLE 500 MG/1
TABLET ORAL
Qty: 3 TABLET | Refills: 0 | Status: SHIPPED | OUTPATIENT
Start: 2021-04-16

## 2021-04-21 ENCOUNTER — DOCUMENTATION ONLY (OUTPATIENT)
Dept: SURGERY | Facility: HOSPITAL | Age: 71
End: 2021-04-21